# Patient Record
Sex: FEMALE | Race: WHITE | Employment: FULL TIME | URBAN - NONMETROPOLITAN AREA
[De-identification: names, ages, dates, MRNs, and addresses within clinical notes are randomized per-mention and may not be internally consistent; named-entity substitution may affect disease eponyms.]

---

## 2017-01-05 ENCOUNTER — ANTI-COAG VISIT (OUTPATIENT)
Dept: OTHER | Age: 42
End: 2017-01-05

## 2017-01-05 VITALS
DIASTOLIC BLOOD PRESSURE: 65 MMHG | WEIGHT: 171.8 LBS | BODY MASS INDEX: 31.42 KG/M2 | HEART RATE: 74 BPM | SYSTOLIC BLOOD PRESSURE: 110 MMHG

## 2017-01-05 DIAGNOSIS — I82.401 DEEP VEIN THROMBOSIS (DVT) OF RIGHT LOWER EXTREMITY, UNSPECIFIED CHRONICITY, UNSPECIFIED VEIN (HCC): ICD-10-CM

## 2017-01-05 LAB
INR BLD: 2.1
POC INR: 2.1 (ref 0.8–1.2)

## 2017-01-05 PROCEDURE — 99212 OFFICE O/P EST SF 10 MIN: CPT | Performed by: NURSE PRACTITIONER

## 2017-01-05 PROCEDURE — 85610 PROTHROMBIN TIME: CPT | Performed by: NURSE PRACTITIONER

## 2017-01-05 ASSESSMENT — ENCOUNTER SYMPTOMS
DIARRHEA: 0
BLOOD IN STOOL: 0
CONSTIPATION: 0
SHORTNESS OF BREATH: 0

## 2017-03-27 ENCOUNTER — TELEPHONE (OUTPATIENT)
Dept: OTHER | Age: 42
End: 2017-03-27

## 2017-08-09 ENCOUNTER — HOSPITAL ENCOUNTER (OUTPATIENT)
Age: 42
Discharge: HOME | End: 2017-08-09
Payer: COMMERCIAL

## 2017-08-09 LAB
HCG,BETA SUBUNIT,QUAL,SERUM: < 0.1 MIU/ML (ref 0–5)
LD: 208 U/L (ref 100–190)

## 2017-08-09 PROCEDURE — 36415 COLL VENOUS BLD VENIPUNCTURE: CPT

## 2017-08-09 PROCEDURE — 84702 CHORIONIC GONADOTROPIN TEST: CPT

## 2017-08-09 PROCEDURE — 83615 LACTATE (LD) (LDH) ENZYME: CPT

## 2018-03-08 ENCOUNTER — HOSPITAL ENCOUNTER (EMERGENCY)
Age: 43
Discharge: HOME | End: 2018-03-08
Attending: EMERGENCY MEDICINE
Payer: COMMERCIAL

## 2018-03-08 VITALS
OXYGEN SATURATION: 100 % | HEART RATE: 77 BPM | SYSTOLIC BLOOD PRESSURE: 142 MMHG | TEMPERATURE: 99.1 F | RESPIRATION RATE: 16 BRPM | DIASTOLIC BLOOD PRESSURE: 70 MMHG

## 2018-03-08 DIAGNOSIS — J06.9 ACUTE UPPER RESPIRATORY INFECTION: Primary | ICD-10-CM

## 2018-03-08 PROCEDURE — 99282 EMERGENCY DEPT VISIT SF MDM: CPT

## 2018-03-08 PROCEDURE — 94640 AIRWAY INHALATION TREATMENT: CPT

## 2018-03-08 PROCEDURE — 6370000000 HC RX 637 (ALT 250 FOR IP): Performed by: EMERGENCY MEDICINE

## 2018-03-08 RX ORDER — DOXYCYCLINE HYCLATE 100 MG
100 TABLET ORAL ONCE
Status: COMPLETED | OUTPATIENT
Start: 2018-03-08 | End: 2018-03-08

## 2018-03-08 RX ORDER — PREDNISONE 20 MG/1
60 TABLET ORAL ONCE
Status: COMPLETED | OUTPATIENT
Start: 2018-03-08 | End: 2018-03-08

## 2018-03-08 RX ORDER — IPRATROPIUM BROMIDE AND ALBUTEROL SULFATE 2.5; .5 MG/3ML; MG/3ML
1 SOLUTION RESPIRATORY (INHALATION) ONCE
Status: COMPLETED | OUTPATIENT
Start: 2018-03-08 | End: 2018-03-08

## 2018-03-08 RX ORDER — PREDNISONE 10 MG/1
TABLET ORAL
Qty: 30 TABLET | Refills: 0 | Status: ON HOLD | OUTPATIENT
Start: 2018-03-08 | End: 2018-11-20 | Stop reason: ALTCHOICE

## 2018-03-08 RX ORDER — DOXYCYCLINE HYCLATE 100 MG
100 TABLET ORAL 2 TIMES DAILY
Qty: 20 TABLET | Refills: 0 | Status: SHIPPED | OUTPATIENT
Start: 2018-03-08 | End: 2018-03-18

## 2018-03-08 RX ADMIN — DOXYCYCLINE HYCLATE 100 MG: 100 TABLET, COATED ORAL at 20:39

## 2018-03-08 RX ADMIN — PREDNISONE 60 MG: 20 TABLET ORAL at 20:39

## 2018-03-08 RX ADMIN — IPRATROPIUM BROMIDE AND ALBUTEROL SULFATE 1 AMPULE: .5; 3 SOLUTION RESPIRATORY (INHALATION) at 20:57

## 2018-03-08 ASSESSMENT — PAIN SCALES - GENERAL: PAINLEVEL_OUTOF10: 5

## 2018-03-08 ASSESSMENT — PAIN DESCRIPTION - PAIN TYPE: TYPE: ACUTE PAIN

## 2018-03-08 ASSESSMENT — PAIN DESCRIPTION - LOCATION: LOCATION: THROAT

## 2018-03-09 NOTE — ED TRIAGE NOTES
Woke up this am and did not have a voice. Also started coughing this am and cough seems to be worse. Does not hurt to eat or drink, just hurts to cough. Took a tylenol severe cold this am, states did not help. Does have a loud barky dry cough.

## 2018-03-09 NOTE — ED NOTES
Breathing tx completed, pt states shes \"feels a little better:\"     Salvatore Alpers, PETER  03/08/18 6646

## 2018-03-09 NOTE — ED NOTES
Discharge instructions given, pt voices understanding regarding new medications.        Juan Dickens RN  03/08/18 6595

## 2018-03-09 NOTE — ED PROVIDER NOTES
[facility]  [address]  Phone: [phone]    Emergency Department encounter      Sheryl Gannon    Chief Complaint   Patient presents with    Cough    Pharyngitis       HPI    Vanessa Lester is a 38 y.o. female who presents  Above-noted complaint. Patient states she's been doing fine except the last 2 days of worsening cough and now laryngitis. She denies chest pain abdominal pain but has a raspy cough. She is a barky cough. Denies nausea vomiting or diaphoresis. She states she can swallow secretions and has no oral swelling or other findings. PAST MEDICAL HISTORY    Past Medical History:   Diagnosis Date    Cancer of ovary [facility])     april 2010    Hyperlipidemia     Psoriasis     Uterus cancer [facility])     april 2010       SURGICAL HISTORY    Past Surgical History:   Procedure Laterality Date    HYSTERECTOMY  4/2010    total    TYMPANOSTOMY TUBE PLACEMENT      As a child       CURRENT MEDICATIONS    Current Outpatient Rx   Medication Sig Dispense Refill    doxycycline hyclate (VIBRA-TABS) 100 MG tablet Take 1 tablet by mouth 2 times daily for 10 days 20 tablet 0    predniSONE (DELTASONE) 10 MG tablet 4 po Q. day for 3 days, 3 p.o. q. day for 3 days, 2 p.o. q. day for 3 days, one p.o. q. day for 3 days 30 tablet 0    warfarin (COUMADIN) 1 MG tablet Take 2 tablets Daily or as directed by [facility] (#70=30 day supply) 70 tablet 5    calcipotriene (DOVONEX) 0.005 % ointment Apply topically Indications: Psoriasis 1-2 times/day      fluticasone (FLONASE) 50 MCG/ACT nasal spray 2 sprays by Nasal route daily.  1 Bottle 3    levothyroxine (SYNTHROID) 100 MCG tablet Take 100 mcg by mouth Daily Indications: Impaired Thyroid Function       citalopram (CELEXA) 10 MG tablet Take 10 mg by mouth daily Indications: Lowered Mood       omeprazole (PRILOSEC) 20 MG capsule Take 20 mg by mouth daily Indications: Stomach Discomfort       Multiple tenderness. Integument:  Warm, Dry, No erythema, No rash (on exposed areas)   Lymphatic:  No lymphadenopathy noted. Neurologic:  Alert & oriented x 3, Normal motor function, Normal sensory function, No focal deficits noted. Psychiatric:  Affect normal, Judgment normal, Mood normal.                     RADIOLOGY    No orders to display       PROCEDURES    none      CONSULTS:  None      CRITICAL CARE:  None    SCREENINGS  BP (!) 142/70   Pulse 77   Temp 99.1 °F (37.3 °C)   Resp 16   SpO2 100%        Screening For Hypertension and Follow-up (#317)  patient informed that blood pressure is abnormal and in the pre-hypertension range and should be rechecked by primary care      Screening For Tobacco Use and Cessation Intervention (#226):   reports that she has never smoked. She has never used smokeless tobacco.  Non-smoker not applicable for screen        ED COURSE & MEDICAL DECISION MAKING    Pertinent Labs & Imaging studies reviewed. (See chart for details)  Patient presents with URI and cough. Has somewhat of a croupy cough. Likely has some upper respiratory infection. I was worried about upper airway and a patient with croup-type symptoms. She has no difficulty swallowing. No neck pain to suggest peritonsillar infection, nothing to suggest retropharyngeal abscess or other problems. She is not septic toxic or ill. Her oxygen level looks well and 100%. I recommended further evaluation as an outpatient. I'm covering her with doxycycline which will cover for upper airway inflammation or infection. Prednisone given to decrease upper airway swelling and inflammation. She is cautioned to watch her use of bleeding being on Coumadin. FINAL IMPRESSION    1.  Acute upper respiratory infection         PATIENT REFERRED TO:  Mahnaz Ramirez  [address] [address] [phone]    Call   For evaluation      DISCHARGE MEDICATIONS:  New Prescriptions    DOXYCYCLINE HYCLATE (VIBRA-TABS) 100 MG TABLET    Take 1 tablet by mouth 2 times daily for 10 days    PREDNISONE (DELTASONE) 10 MG TABLET    4 po Q. day for 3 days, 3 p.o. q. day for 3 days, 2 p.o. q. day for 3 days, one p.o. q. day for 3 days          Hector Lacy MD  03/08/18 2048

## 2018-08-06 ENCOUNTER — HOSPITAL ENCOUNTER (OUTPATIENT)
Age: 43
Discharge: HOME | End: 2018-08-06
Payer: COMMERCIAL

## 2018-08-06 PROCEDURE — 84702 CHORIONIC GONADOTROPIN TEST: CPT

## 2018-08-06 PROCEDURE — 36415 COLL VENOUS BLD VENIPUNCTURE: CPT

## 2018-08-06 PROCEDURE — 83615 LACTATE (LD) (LDH) ENZYME: CPT

## 2018-08-07 LAB — LD: 208 U/L (ref 100–190)

## 2018-08-08 LAB — BETA HCG QUANT, MALE: < 1 IU/L (ref 0–5)

## 2018-11-15 ENCOUNTER — HOSPITAL ENCOUNTER (OUTPATIENT)
Dept: WOMENS IMAGING | Age: 43
Discharge: HOME | End: 2018-11-15
Payer: COMMERCIAL

## 2018-11-15 DIAGNOSIS — Z12.39 BREAST SCREENING: ICD-10-CM

## 2018-11-15 PROCEDURE — 77063 BREAST TOMOSYNTHESIS BI: CPT

## 2018-11-20 ENCOUNTER — HOSPITAL ENCOUNTER (OUTPATIENT)
Age: 43
Setting detail: OUTPATIENT SURGERY
Discharge: HOME | End: 2018-11-20
Attending: INTERNAL MEDICINE | Admitting: INTERNAL MEDICINE
Payer: COMMERCIAL

## 2018-11-20 ENCOUNTER — ANESTHESIA EVENT (OUTPATIENT)
Dept: ENDOSCOPY | Age: 43
End: 2018-11-20
Payer: COMMERCIAL

## 2018-11-20 ENCOUNTER — ANESTHESIA (OUTPATIENT)
Dept: ENDOSCOPY | Age: 43
End: 2018-11-20
Payer: COMMERCIAL

## 2018-11-20 VITALS
DIASTOLIC BLOOD PRESSURE: 65 MMHG | OXYGEN SATURATION: 100 % | SYSTOLIC BLOOD PRESSURE: 101 MMHG | HEIGHT: 62 IN | WEIGHT: 172.6 LBS | RESPIRATION RATE: 16 BRPM | TEMPERATURE: 98.2 F | HEART RATE: 70 BPM | BODY MASS INDEX: 31.76 KG/M2

## 2018-11-20 VITALS
DIASTOLIC BLOOD PRESSURE: 71 MMHG | RESPIRATION RATE: 14 BRPM | SYSTOLIC BLOOD PRESSURE: 121 MMHG | OXYGEN SATURATION: 100 %

## 2018-11-20 PROCEDURE — 2709999900 HC NON-CHARGEABLE SUPPLY: Performed by: INTERNAL MEDICINE

## 2018-11-20 PROCEDURE — 3700000000 HC ANESTHESIA ATTENDED CARE: Performed by: INTERNAL MEDICINE

## 2018-11-20 PROCEDURE — 7100000001 HC PACU RECOVERY - ADDTL 15 MIN: Performed by: INTERNAL MEDICINE

## 2018-11-20 PROCEDURE — 6360000002 HC RX W HCPCS: Performed by: REGISTERED NURSE

## 2018-11-20 PROCEDURE — 7100000000 HC PACU RECOVERY - FIRST 15 MIN: Performed by: INTERNAL MEDICINE

## 2018-11-20 PROCEDURE — 2500000003 HC RX 250 WO HCPCS: Performed by: REGISTERED NURSE

## 2018-11-20 PROCEDURE — 3609012400 HC EGD TRANSORAL BIOPSY SINGLE/MULTIPLE: Performed by: INTERNAL MEDICINE

## 2018-11-20 PROCEDURE — 88305 TISSUE EXAM BY PATHOLOGIST: CPT

## 2018-11-20 PROCEDURE — 2580000003 HC RX 258: Performed by: INTERNAL MEDICINE

## 2018-11-20 RX ORDER — PROPOFOL 10 MG/ML
INJECTION, EMULSION INTRAVENOUS PRN
Status: DISCONTINUED | OUTPATIENT
Start: 2018-11-20 | End: 2018-11-20 | Stop reason: SDUPTHER

## 2018-11-20 RX ORDER — SODIUM CHLORIDE 450 MG/100ML
INJECTION, SOLUTION INTRAVENOUS CONTINUOUS
Status: DISCONTINUED | OUTPATIENT
Start: 2018-11-20 | End: 2018-11-20 | Stop reason: HOSPADM

## 2018-11-20 RX ORDER — LIDOCAINE HYDROCHLORIDE 20 MG/ML
INJECTION, SOLUTION EPIDURAL; INFILTRATION; INTRACAUDAL; PERINEURAL PRN
Status: DISCONTINUED | OUTPATIENT
Start: 2018-11-20 | End: 2018-11-20 | Stop reason: SDUPTHER

## 2018-11-20 RX ADMIN — LIDOCAINE HYDROCHLORIDE 100 MG: 20 INJECTION, SOLUTION EPIDURAL; INFILTRATION; INTRACAUDAL; PERINEURAL at 10:28

## 2018-11-20 RX ADMIN — PROPOFOL 250 MG: 10 INJECTION, EMULSION INTRAVENOUS at 10:28

## 2018-11-20 RX ADMIN — SODIUM CHLORIDE: 4.5 INJECTION, SOLUTION INTRAVENOUS at 08:43

## 2018-11-20 ASSESSMENT — PAIN - FUNCTIONAL ASSESSMENT: PAIN_FUNCTIONAL_ASSESSMENT: 0-10

## 2018-11-20 ASSESSMENT — PAIN SCALES - GENERAL
PAINLEVEL_OUTOF10: 0
PAINLEVEL_OUTOF10: 0

## 2018-11-20 ASSESSMENT — PAIN SCALES - WONG BAKER: WONGBAKER_NUMERICALRESPONSE: 0

## 2018-11-20 NOTE — PROGRESS NOTES
Patient arrived to PACU. Vital signs stable on room air. Dr. Brisa Snow in to speak with patient and family regarding procedure, findings and plan of care. Pictures used to describe procedure in detail.

## 2018-11-20 NOTE — ANESTHESIA PRE PROCEDURE
Department of Anesthesiology  Preprocedure Note       Name:  Perla Olguin   Age:  37 y.o.  :  1975                                          MRN:  [id number]         Date:  2018      Surgeon: Emmanuel Hartman):  Flordia Heimlich, MD    Procedure: EGD (N/A )    Medications prior to admission:   Prior to Admission medications    Medication Sig Start Date End Date Taking? Authorizing Provider   calcipotriene (DOVONEX) 0.005 % ointment Apply topically Indications: Psoriasis 1-2 times/day 16  Yes Historical Provider, MD   levothyroxine (SYNTHROID) 100 MCG tablet Take 100 mcg by mouth Daily Indications: Impaired Thyroid Function    Yes Historical Provider, MD   citalopram (CELEXA) 10 MG tablet Take 10 mg by mouth daily Indications: Lowered Mood    Yes Historical Provider, MD   omeprazole (PRILOSEC) 20 MG capsule Take 20 mg by mouth daily Indications: Stomach Discomfort    Yes Historical Provider, MD   Multiple Vitamin (MULTI-VITAMIN PO) Take by mouth Indications: Treatment to Prevent Vitamin Deficiency    Yes Historical Provider, MD   simvastatin (ZOCOR) 20 MG tablet Take 20 mg by mouth nightly Indications: Blood Cholesterol Abnormal    Yes Historical Provider, MD   warfarin (COUMADIN) 1 MG tablet Take 2 tablets Daily or as directed by [facility] (#70=30 day supply) 16   PRAKASH Rodríguez - CNP   fluticasone (FLONASE) 50 MCG/ACT nasal spray 2 sprays by Nasal route daily. 14   Yuly Ovalle MD       Current medications:    Current Facility-Administered Medications   Medication Dose Route Frequency Provider Last Rate Last Dose    0.45 % sodium chloride infusion   Intravenous Continuous Flordia Heimlich, MD 75 mL/hr at 18 0843         Allergies:     Allergies   Allergen Reactions    Amoxicillin-Pot Clavulanate Hives    Bee Venom     Penicillins Hives       Problem List:    Patient Active Problem List   Diagnosis Code    Hearing loss H91.90    Tinnitus H93.19    Eustachian tube dysfunction H69.80    Pulmonary embolism (HCC) I26.99    History of ovarian cancer Z85.43    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    Adiposity E66.9    Anticoagulated on Coumadin Z51.81, Z79.01    Deep vein thrombosis (HCC) I82.409    Psoriasis L40.9       Past Medical History:        Diagnosis Date    Cancer of ovary ([facility] [address]     april 2010    Hyperlipidemia     Psoriasis     Uterus cancer [facility])     april 2010       Past Surgical History:        Procedure Laterality Date    HYSTERECTOMY  4/2010    total    TYMPANOSTOMY TUBE PLACEMENT      As a child       Social History:    Social History   Substance Use Topics    Smoking status: Never Smoker    Smokeless tobacco: Never Used    Alcohol use No                                Counseling given: Not Answered      Vital Signs (Current):   Vitals:    11/20/18 0837   BP: 114/71   Pulse: 68   Resp: 16   Temp: 36.6 °C (97.9 °F)   TempSrc: Temporal   SpO2: 98%   Weight: 172 lb 9.6 oz (78.3 kg)   Height: 5' 2\" (1.575 m)                                              BP Readings from Last 3 Encounters:   11/20/18 114/71   03/08/18 (!) 142/70   01/05/17 110/65       NPO Status: Time of last liquid consumption: 2000                        Time of last solid consumption: 1800                        Date of last liquid consumption: 11/19/18                        Date of last solid food consumption: 11/19/18    BMI:   Wt Readings from Last 3 Encounters:   11/20/18 172 lb 9.6 oz (78.3 kg)   01/05/17 171 lb 12.8 oz (77.9 kg)   12/22/16 172 lb (78 kg)     Body mass index is 31.57 kg/m².     CBC:   Lab Results   Component Value Date    WBC 5.3 04/17/2016    RBC 4.39 04/17/2016    RBC 3-5 11/28/2011    HGB 13.2 11/01/2016    HCT 40.1 11/01/2016    MCV 95.9 04/17/2016    RDW 11.3 04/17/2016     04/17/2016       CMP:   Lab Results   Component Value Date     06/14/2013    K 4.4 06/14/2013     06/14/2013    CO2 31 06/14/2013    BUN 21 06/14/2013    CREATININE 0.9 06/14/2013

## 2019-07-15 ENCOUNTER — ANESTHESIA (OUTPATIENT)
Dept: ENDOSCOPY | Age: 44
End: 2019-07-15
Payer: COMMERCIAL

## 2019-07-15 ENCOUNTER — HOSPITAL ENCOUNTER (OUTPATIENT)
Age: 44
Setting detail: OUTPATIENT SURGERY
Discharge: HOME | End: 2019-07-15
Attending: INTERNAL MEDICINE | Admitting: INTERNAL MEDICINE
Payer: COMMERCIAL

## 2019-07-15 ENCOUNTER — ANESTHESIA EVENT (OUTPATIENT)
Dept: ENDOSCOPY | Age: 44
End: 2019-07-15
Payer: COMMERCIAL

## 2019-07-15 VITALS
OXYGEN SATURATION: 100 % | DIASTOLIC BLOOD PRESSURE: 59 MMHG | RESPIRATION RATE: 18 BRPM | SYSTOLIC BLOOD PRESSURE: 94 MMHG

## 2019-07-15 VITALS
BODY MASS INDEX: 30.73 KG/M2 | TEMPERATURE: 97.1 F | SYSTOLIC BLOOD PRESSURE: 114 MMHG | DIASTOLIC BLOOD PRESSURE: 66 MMHG | OXYGEN SATURATION: 99 % | WEIGHT: 167 LBS | HEART RATE: 65 BPM | RESPIRATION RATE: 16 BRPM | HEIGHT: 62 IN

## 2019-07-15 PROCEDURE — 7100000000 HC PACU RECOVERY - FIRST 15 MIN: Performed by: INTERNAL MEDICINE

## 2019-07-15 PROCEDURE — 2709999900 HC NON-CHARGEABLE SUPPLY: Performed by: INTERNAL MEDICINE

## 2019-07-15 PROCEDURE — 88305 TISSUE EXAM BY PATHOLOGIST: CPT

## 2019-07-15 PROCEDURE — 3609010600 HC COLONOSCOPY POLYPECTOMY SNARE/COLD BIOPSY: Performed by: INTERNAL MEDICINE

## 2019-07-15 PROCEDURE — 2500000003 HC RX 250 WO HCPCS: Performed by: NURSE ANESTHETIST, CERTIFIED REGISTERED

## 2019-07-15 PROCEDURE — 6360000002 HC RX W HCPCS: Performed by: NURSE ANESTHETIST, CERTIFIED REGISTERED

## 2019-07-15 PROCEDURE — 7100000001 HC PACU RECOVERY - ADDTL 15 MIN: Performed by: INTERNAL MEDICINE

## 2019-07-15 PROCEDURE — 3700000000 HC ANESTHESIA ATTENDED CARE: Performed by: INTERNAL MEDICINE

## 2019-07-15 PROCEDURE — 3700000001 HC ADD 15 MINUTES (ANESTHESIA): Performed by: INTERNAL MEDICINE

## 2019-07-15 PROCEDURE — 2580000003 HC RX 258: Performed by: INTERNAL MEDICINE

## 2019-07-15 RX ORDER — PROPOFOL 10 MG/ML
INJECTION, EMULSION INTRAVENOUS PRN
Status: DISCONTINUED | OUTPATIENT
Start: 2019-07-15 | End: 2019-07-15 | Stop reason: SDUPTHER

## 2019-07-15 RX ORDER — LIDOCAINE HYDROCHLORIDE 10 MG/ML
INJECTION, SOLUTION INFILTRATION; PERINEURAL PRN
Status: DISCONTINUED | OUTPATIENT
Start: 2019-07-15 | End: 2019-07-15 | Stop reason: SDUPTHER

## 2019-07-15 RX ORDER — SODIUM CHLORIDE 450 MG/100ML
INJECTION, SOLUTION INTRAVENOUS CONTINUOUS
Status: DISCONTINUED | OUTPATIENT
Start: 2019-07-15 | End: 2019-07-15 | Stop reason: HOSPADM

## 2019-07-15 RX ADMIN — PROPOFOL 80 MG: 10 INJECTION, EMULSION INTRAVENOUS at 09:25

## 2019-07-15 RX ADMIN — SODIUM CHLORIDE: 4.5 INJECTION, SOLUTION INTRAVENOUS at 09:18

## 2019-07-15 RX ADMIN — LIDOCAINE HYDROCHLORIDE 50 MG: 10 INJECTION, SOLUTION INFILTRATION; PERINEURAL at 09:25

## 2019-07-15 RX ADMIN — PROPOFOL 260 MG: 10 INJECTION, EMULSION INTRAVENOUS at 09:27

## 2019-07-15 ASSESSMENT — PAIN SCALES - GENERAL
PAINLEVEL_OUTOF10: 0

## 2019-07-15 ASSESSMENT — PAIN - FUNCTIONAL ASSESSMENT: PAIN_FUNCTIONAL_ASSESSMENT: 0-10

## 2019-07-15 ASSESSMENT — LIFESTYLE VARIABLES: SMOKING_STATUS: 0

## 2019-07-15 NOTE — PROCEDURES
[address] [place] [address]                                 PROCEDURE NOTE    PATIENT NAME: Tereso Haque                   :        1975  MED REC NO:   [id number]                           ROOM:  ACCOUNT NO:   [de-identified]                           ADMIT DATE: 07/15/2019  PROVIDER:     CLAUDINE Anaya Decree OF PROCEDURE:  07/15/2019    PROCEDURE:  Colonoscopy. INDICATION:  The patient is a 49-year-old pleasant female, personal  history of colon polyps. She is undergoing further evaluation. Please  see my brief history for details and for physical examination. ASA CLASSIFICATION:  As per Anesthesia. Please see Anesthesia note for  details. INSTRUMENT:  PCF-H190AL pediatric colonoscope. PHOTOGRAPHS: Yes. BIOPSIES:  Yes. CONSENT:  Procedure, indications and complications including but not  limited to perforation, bleeding, infection, adverse reaction to  medicine, very slight chance of missing significant lesions discussed  with the patient. The patient expressed her understanding and a written  consent was obtained. DESCRIPTION OF PROCEDURE:  The patient was placed in the left lateral  decubitus position in the Endo Room #13. The patient was placed on  appropriate monitoring of vitals including blood pressure, heart rate  and pulse ox. After the rectal examination, the PCF-H190L pediatric  colonoscope was inserted into the rectum, advanced up to the sigmoid  colon. The patient had fecal material adherent to the colonic lining,  the material was soft. I did not see any hard stools because of the  soft stool. It was washed out and the scope was slowly advanced into  the cecum with gentle sigmoid pressure. On careful inspection, the  preparation was fair. On withdrawal of the scope, appendiceal orifice  and cecum looks normal as shown in picture #A2.   Ascending colon looks  normal as shown in pictures #A3 and A4. Transverse colon looks normal  as shown in pictures #A5 and A6. Descending colon looks normal as shown  in pictures #A7 and A8. In the proximal descending colon, the patient  had one 4-mm flat polyp as shown in picture #A1. The polyp was removed  by cold snare polypectomy. On retroflex, rectum looks normal as shown  in picture #A9. Air was withdrawn as the scope was removed. The  patient tolerated the procedure well without any immediate  complications. Vitals including blood pressure, heart rate and pulse ox  were stable during the procedure and after the procedure. The patient  transferred to the recovery room in stable condition. IMPRESSION:  Colonoscopy to cecum. One 4-mm flat polyp in the  descending colon removed by snare polypectomy. RECOMMENDATIONS. High-fiber diet. Fiber supplements. Restart Coumadin  on 07/18/2019. Call my office in less than two weeks for the biopsy  results. Followup colonoscopy in five years pending the biopsy results. Thank you Nohelia Antonio for letting me do the procedure on the patient. Do not hesitate to call me if you have any questions. My  recommendations are above.         Gema Poe M.D.    D: 07/15/2019 10:04:08       T: 07/15/2019 10:07:41     [id number]  Job#: [id number]     Doc#: [id number]    CC:  Elex Moritz, MD Carla Chang, M.D.

## 2019-07-15 NOTE — OP NOTE
[address] [place] [address] Endoscopy    CONSCIOUS SEDATION      Patient: Stephanie De Leon  : 1975  Acct#: [de-identified]    PLANNED PROCEDURE   []EGD  [x]Colonoscopy []Flex Sigmoid  []Other:  Indication: Pain control for GI procedure    Consent: I have discussed with the patient and/or the patient representative the indication, alternatives, and the possible risks and/or complications of the planned procedure and the anesthesia methods. The patient and/or patient representative appear to understand and agree to proceed. Pre-Sedation Documentation and Exam: I have personally completed a history, physical exam & review of systems for this patient (see notes). Airway Assessment: mac    Prior History of Anesthesia Complications: mac    ASA Classification: mac    Sedation/ Anesthesia Plan: mac      SEDATION  Planned agent:[x]Midazolam []Meperidine [x]Sublimaze []Morphine    Monitoring and Safety: The patient will be placed on a cardiac monitor and vital signs, pulse oximetry and level of consciousness will be continuously evaluated throughout the procedure. The patient will be closely monitored until recovery from the medications is complete and the patient has returned to baseline status. Respiratory therapy will be on standby during the procedure. I have reviewed with the patient and/or family the risks, benefits, and alternatives to the procedure.     Gab Chapa MD, STEFANIP  7/15/2019, 9:18 AM    Post-Sedation Vital Signs: Vital signs were reviewed and were stable after the procedure (see flow sheet for vitals)            Post-Sedation Exam: Lungs: clear           Complications: none     Gab Chapa MD, CNSP  7/15/2019,

## 2019-07-15 NOTE — PROGRESS NOTES
4853: Received to PACU post colonoscopy. Patient drowsy but arouses to verbal stimuli. Denies pain, nausea. Passing flatus. Family at bedside. 1008:Dr Gwen Jain at bedside to speak with patient and family regarding procedure findings and plan of care. 1011: Tolerating oral fluids well. Continues to deny pain, nausea. 1024: Printed discharge instructions provided to patient. Information discussed with patient and family. Verbalized understanding.

## 2019-07-15 NOTE — ANESTHESIA POSTPROCEDURE EVALUATION
Department of Anesthesiology  Postprocedure Note    Patient: Marvin Franco  MRN: [id number]  YOB: 1975  Date of evaluation: 7/15/2019  Time:  10:28 AM     Procedure Summary     Date:  07/15/19 Room / Location:  [address] [place] [address] 13 [facility]    Anesthesia Start:  [address] Anesthesia Stop:  9148    Procedure:  COLONOSCOPY POLYPECTOMY SNARE/COLD BIOPSY (Left ) Diagnosis:  (TURNERS)    Surgeon:  Sana Nieto MD Responsible Provider:  Ruba Rubin DO    Anesthesia Type:  general, TIVA ASA Status:  3          Anesthesia Type: No value filed. Aimee Phase I: Aimee Score: 9    Aimee Phase II: Aimee Score: 10    Last vitals: Reviewed and per EMR flowsheets.        Anesthesia Post Evaluation    Patient location during evaluation: bedside  Patient participation: complete - patient participated  Level of consciousness: awake and alert  Pain score: 0  Airway patency: patent  Nausea & Vomiting: no nausea and no vomiting  Complications: no  Cardiovascular status: hemodynamically stable  Respiratory status: acceptable, room air and spontaneous ventilation  Hydration status: euvolemic

## 2019-12-17 ENCOUNTER — HOSPITAL ENCOUNTER (OUTPATIENT)
Dept: WOMENS IMAGING | Age: 44
Discharge: HOME | End: 2019-12-17
Payer: COMMERCIAL

## 2019-12-17 DIAGNOSIS — Z12.31 VISIT FOR SCREENING MAMMOGRAM: ICD-10-CM

## 2019-12-17 PROCEDURE — 77063 BREAST TOMOSYNTHESIS BI: CPT

## 2020-12-30 ENCOUNTER — HOSPITAL ENCOUNTER (OUTPATIENT)
Dept: WOMENS IMAGING | Age: 45
Discharge: HOME | End: 2020-12-30
Payer: COMMERCIAL

## 2020-12-30 PROCEDURE — 77063 BREAST TOMOSYNTHESIS BI: CPT

## 2022-01-17 ENCOUNTER — HOSPITAL ENCOUNTER (OUTPATIENT)
Dept: WOMENS IMAGING | Age: 47
Discharge: HOME | End: 2022-01-17
Payer: COMMERCIAL

## 2022-01-17 DIAGNOSIS — Z12.31 VISIT FOR SCREENING MAMMOGRAM: ICD-10-CM

## 2022-01-17 PROCEDURE — 77063 BREAST TOMOSYNTHESIS BI: CPT

## 2023-01-18 ENCOUNTER — HOSPITAL ENCOUNTER (OUTPATIENT)
Dept: WOMENS IMAGING | Age: 48
Discharge: HOME | End: 2023-01-18
Payer: COMMERCIAL

## 2023-01-18 DIAGNOSIS — Z12.31 VISIT FOR SCREENING MAMMOGRAM: ICD-10-CM

## 2023-01-18 PROCEDURE — 77063 BREAST TOMOSYNTHESIS BI: CPT

## 2023-01-31 ENCOUNTER — TELEPHONE (OUTPATIENT)
Dept: FAMILY MEDICINE CLINIC | Age: 48
End: 2023-01-31

## 2023-01-31 NOTE — TELEPHONE ENCOUNTER
[address] requesting refills on    Citalopram 10MG Tab  Take 1 tablet by mouth once daily    Omeprazole 20MG Cap  Take 1 capsule by mouth twice daily

## 2024-01-19 ENCOUNTER — HOSPITAL ENCOUNTER (OUTPATIENT)
Dept: WOMENS IMAGING | Age: 49
Discharge: HOME | End: 2024-01-19
Payer: COMMERCIAL

## 2024-01-19 VITALS — WEIGHT: 165 LBS | HEIGHT: 62 IN | BODY MASS INDEX: 30.36 KG/M2

## 2024-01-19 DIAGNOSIS — Z12.31 VISIT FOR SCREENING MAMMOGRAM: ICD-10-CM

## 2024-01-19 PROCEDURE — 77063 BREAST TOMOSYNTHESIS BI: CPT

## 2025-01-20 ENCOUNTER — HOSPITAL ENCOUNTER (OUTPATIENT)
Dept: WOMENS IMAGING | Age: 50
Discharge: HOME | End: 2025-01-20
Payer: COMMERCIAL

## 2025-01-20 VITALS — WEIGHT: 165 LBS | BODY MASS INDEX: 30.36 KG/M2 | HEIGHT: 62 IN

## 2025-01-20 DIAGNOSIS — Z12.31 VISIT FOR SCREENING MAMMOGRAM: ICD-10-CM

## 2025-01-20 PROCEDURE — 77063 BREAST TOMOSYNTHESIS BI: CPT

## (undated) DEVICE — IV START KIT: Brand: MEDLINE INDUSTRIES, INC.

## (undated) DEVICE — SET ADMIN 25ML L117IN PMP MOD CK VLV RLER CLMP 2 SMRTSITE

## (undated) DEVICE — ENDO KIT: Brand: MEDLINE INDUSTRIES, INC.

## (undated) DEVICE — CATHETER ETER IV 22GA L1IN POLYUR STR RADPQ INTROCAN SFTY

## (undated) DEVICE — TUBING IV STOPCOCK 48 CM 3 W

## (undated) DEVICE — CONMED SCOPE SAVER BITE BLOCK, 20X27 MM: Brand: SCOPE SAVER

## (undated) DEVICE — CONNECTOR TBNG AUX H2O JET DISP FOR OLY 160/180 SER

## (undated) DEVICE — SOLUTION IV 1000ML 0.45% SOD CHL PH 5 INJ USP VIAFLX PLAS

## (undated) DEVICE — SET LNR RED GRN W/ BASE CLEANASCOPE

## (undated) DEVICE — FORCEP RAD JAW W/NEEDLE 160CM